# Patient Record
Sex: FEMALE | Race: BLACK OR AFRICAN AMERICAN | URBAN - NONMETROPOLITAN AREA
[De-identification: names, ages, dates, MRNs, and addresses within clinical notes are randomized per-mention and may not be internally consistent; named-entity substitution may affect disease eponyms.]

---

## 2024-02-14 ENCOUNTER — APPOINTMENT (OUTPATIENT)
Dept: URBAN - NONMETROPOLITAN AREA SURGERY 8 | Age: 54
Setting detail: DERMATOLOGY
End: 2024-02-19

## 2024-02-14 DIAGNOSIS — Z71.89 OTHER SPECIFIED COUNSELING: ICD-10-CM

## 2024-02-14 DIAGNOSIS — D22 MELANOCYTIC NEVI: ICD-10-CM

## 2024-02-14 DIAGNOSIS — R21 RASH AND OTHER NONSPECIFIC SKIN ERUPTION: ICD-10-CM

## 2024-02-14 DIAGNOSIS — L85.3 XEROSIS CUTIS: ICD-10-CM

## 2024-02-14 PROBLEM — D22.5 MELANOCYTIC NEVI OF TRUNK: Status: ACTIVE | Noted: 2024-02-14

## 2024-02-14 PROCEDURE — 99203 OFFICE O/P NEW LOW 30 MIN: CPT | Mod: 25

## 2024-02-14 PROCEDURE — OTHER BIOPSY BY PUNCH METHOD: OTHER

## 2024-02-14 PROCEDURE — OTHER PRESCRIPTION: OTHER

## 2024-02-14 PROCEDURE — 11104 PUNCH BX SKIN SINGLE LESION: CPT

## 2024-02-14 PROCEDURE — OTHER ADDITIONAL NOTES: OTHER

## 2024-02-14 PROCEDURE — OTHER MIPS QUALITY: OTHER

## 2024-02-14 PROCEDURE — OTHER COUNSELING: OTHER

## 2024-02-14 RX ORDER — TRIAMCINOLONE ACETONIDE 1 MG/G
CREAM TOPICAL BID
Qty: 453.6 | Refills: 0 | Status: ERX | COMMUNITY
Start: 2024-02-14

## 2024-02-14 ASSESSMENT — LOCATION SIMPLE DESCRIPTION DERM
LOCATION SIMPLE: LEFT UPPER ARM
LOCATION SIMPLE: RIGHT UPPER BACK
LOCATION SIMPLE: UPPER BACK
LOCATION SIMPLE: RIGHT PRETIBIAL REGION
LOCATION SIMPLE: CHEST
LOCATION SIMPLE: LEFT PRETIBIAL REGION
LOCATION SIMPLE: LEFT FOREARM
LOCATION SIMPLE: LEFT UPPER BACK
LOCATION SIMPLE: RIGHT LOWER BACK
LOCATION SIMPLE: RIGHT FOREARM

## 2024-02-14 ASSESSMENT — LOCATION DETAILED DESCRIPTION DERM
LOCATION DETAILED: LEFT MEDIAL SUPERIOR CHEST
LOCATION DETAILED: LEFT INFERIOR UPPER BACK
LOCATION DETAILED: LEFT DISTAL PRETIBIAL REGION
LOCATION DETAILED: SUPERIOR THORACIC SPINE
LOCATION DETAILED: LEFT ANTERIOR DISTAL UPPER ARM
LOCATION DETAILED: RIGHT SUPERIOR MEDIAL UPPER BACK
LOCATION DETAILED: RIGHT VENTRAL PROXIMAL FOREARM
LOCATION DETAILED: RIGHT DISTAL PRETIBIAL REGION
LOCATION DETAILED: LEFT VENTRAL LATERAL DISTAL FOREARM
LOCATION DETAILED: RIGHT INFERIOR LATERAL MIDBACK

## 2024-02-14 ASSESSMENT — LOCATION ZONE DERM
LOCATION ZONE: ARM
LOCATION ZONE: TRUNK
LOCATION ZONE: LEG

## 2024-02-14 NOTE — PROCEDURE: MIPS QUALITY
Quality 431: Preventive Care And Screening: Unhealthy Alcohol Use - Screening: Patient not identified as an unhealthy alcohol user when screened for unhealthy alcohol use using a systematic screening method
Quality 402: Tobacco Use And Help With Quitting Among Adolescents: Patient screened for tobacco and never smoked
Quality 110: Preventive Care And Screening: Influenza Immunization: Influenza Immunization previously received during influenza season
Detail Level: Detailed
Quality 130: Documentation Of Current Medications In The Medical Record: Current Medications Documented

## 2024-02-14 NOTE — PROCEDURE: BIOPSY BY PUNCH METHOD

## 2024-02-14 NOTE — PROCEDURE: ADDITIONAL NOTES
Additional Notes: This is most likely flare of atopic dermatitis that she’s had previously. Most likely will need to fix it due to widespread nature of condition. Would like to biopsy to rule out MF. Examination of buttock today does show mild Xerosis but no patches or plaques resembling MF noted today discuss with patient and family. That cutaneous lymphoma and eczema can appear similar on examination and we will need to follow for both encouraged regular examination of as cutaneous lymphoma is often present in this area.
Detail Level: Zone
Render Risk Assessment In Note?: no

## 2024-02-26 ENCOUNTER — APPOINTMENT (OUTPATIENT)
Dept: URBAN - NONMETROPOLITAN AREA SURGERY 8 | Age: 54
Setting detail: DERMATOLOGY
End: 2024-03-04

## 2024-02-26 DIAGNOSIS — Z48.02 ENCOUNTER FOR REMOVAL OF SUTURES: ICD-10-CM

## 2024-02-26 DIAGNOSIS — L20.89 OTHER ATOPIC DERMATITIS: ICD-10-CM

## 2024-02-26 PROCEDURE — OTHER ADDITIONAL NOTES: OTHER

## 2024-02-26 PROCEDURE — OTHER BIOLOGIC INJECTION TRAINING: OTHER

## 2024-02-26 PROCEDURE — OTHER COUNSELING: OTHER

## 2024-02-26 PROCEDURE — 96372 THER/PROPH/DIAG INJ SC/IM: CPT

## 2024-02-26 PROCEDURE — OTHER SUTURE REMOVAL (GLOBAL PERIOD): OTHER

## 2024-02-26 PROCEDURE — OTHER DUPIXENT INITIATION: OTHER

## 2024-02-26 PROCEDURE — OTHER MIPS QUALITY: OTHER

## 2024-02-26 PROCEDURE — OTHER PRESCRIPTION SAMPLES PROVIDED: OTHER

## 2024-02-26 PROCEDURE — OTHER DUPIXENT INJECTION: OTHER

## 2024-02-26 ASSESSMENT — LOCATION SIMPLE DESCRIPTION DERM
LOCATION SIMPLE: LEFT THIGH
LOCATION SIMPLE: CHEST
LOCATION SIMPLE: RIGHT THIGH
LOCATION SIMPLE: RIGHT LOWER BACK

## 2024-02-26 ASSESSMENT — BSA ECZEMA: % BODY COVERED IN ECZEMA: 20

## 2024-02-26 ASSESSMENT — SEVERITY ASSESSMENT 2020: SEVERITY 2020: MODERATE

## 2024-02-26 ASSESSMENT — LOCATION ZONE DERM
LOCATION ZONE: TRUNK
LOCATION ZONE: LEG
LOCATION ZONE: TRUNK

## 2024-02-26 ASSESSMENT — LOCATION DETAILED DESCRIPTION DERM
LOCATION DETAILED: RIGHT ANTERIOR PROXIMAL THIGH
LOCATION DETAILED: RIGHT SUPERIOR MEDIAL LOWER BACK
LOCATION DETAILED: LEFT ANTERIOR PROXIMAL THIGH
LOCATION DETAILED: RIGHT LATERAL SUPERIOR CHEST

## 2024-02-26 ASSESSMENT — ITCH NUMERIC RATING SCALE: HOW SEVERE IS YOUR ITCHING?: 7

## 2024-02-26 NOTE — PROCEDURE: PRESCRIPTION SAMPLES PROVIDED
Detail Level: Zone
Expiration Date (Optional): 10/31/2025
Lot/Batch Number (Optional): [id number]
Samples Given: Dupixent

## 2024-02-26 NOTE — PROCEDURE: DUPIXENT INJECTION
J-Code: 
Hide Non-Enhanced Ndc Variable: No
36666 Billing Preferences: 1
Ndc (300 Mg Prefilled Pen): 6366-0480-24
Detail Level: None
Syringe Size Used (Required For Enhanced Ndc): 300 mg/2ml prefilled pen
Use Enhanced Ndc?: Yes
Expiration Date (Optional): 10/31/2025
Dupixent Dosing: 600 mg
Consent: The risks of pain and injection site reactions were reviewed with the patient prior to the injection.
Ndc (200 Mg Prefilled Syringe): 1616-6011-13
Additional Comments: Two 300 mg pens were administered
Administered By (Optional): Robert Vincent
Ndc (300 Mg Prefilled Syringe): 9589-2433-93
Ndc (200 Mg Prefilled Pen): 9829-3123-95
Lot # (Optional): [id number]

## 2024-02-26 NOTE — PROCEDURE: DUPIXENT INITIATION
Pregnancy And Lactation Warning Text: There have not been adverse fetal risks in women taking Dupixent while pregnant. It is unknown if this medication is excreted in breast milk.
Dupixent Monitoring Guidelines: There is no laboratory monitoring requirement with Dupixent.
Is Thalidomide Contraindicated?: No
Diagnosis (Required): Atopic Dermatitis/Eczematous Dermatitis
Detail Level: Generalized
Dupixent Dosing: 600 mg SC day 0 then 300 mg SC every other week

## 2024-02-26 NOTE — PROCEDURE: BIOLOGIC INJECTION TRAINING
Skyrizi Training Text: We discussed Skyrizi injection. I demonstrated how to clean the skin and administer the medication.
Siliq Training Text: We discussed Siliq injection. I demonstrated how to clean the skin and administer the medication.
Bimzelx Training Text: We discussed Bimzelx injection. I demonstrated how to clean the skin and administer the medication.
Simponi Training Text: We discussed Simponi injection. I demonstrated how to clean the skin and administer the medication.
Humira Training Text: We discussed Humira injection. I demonstrated how to clean the skin and administer the medication.
Dupixent Training Text: We discussed Dupixent injection. I demonstrated how to clean the skin and administer the medication.
Stelara Training Text: We discussed Stelara injection. I demonstrated how to clean the skin and administer the medication.
Xolair Training Text: We discussed Xolair injection. I demonstrated how to clean the skin and administer the medication.
Cosentyx Training Text: We discussed Cosentyx injection. I demonstrated how to clean the skin and administer the medication.
Cimzia Training Text: We discussed Cimzia injection. I demonstrated how to clean the skin and administer the medication.
Detail Level: Simple
Tremfya Training Text: We discussed Tremfya injection. I demonstrated how to clean the skin and administer the medication.
Taltz Training Text: We discussed Taltz injection. I demonstrated how to clean the skin and administer the medication.
Adbry Training Text: We discussed Adbry injection. I demonstrated how to clean the skin and administer the medication.
Ilumya Training Text: We discussed Ilumya injection. I demonstrated how to clean the skin and administer the medication.
Enbrel Training Text: We discussed Enbrel injection. I demonstrated how to clean the skin and administer the medication.
Which Biologic Is The Patient Receiving Training For?: Dupixent
Who Did You Train: The Patient's Caretaker

## 2024-02-26 NOTE — PROCEDURE: SUTURE REMOVAL (GLOBAL PERIOD)
Body Location Override (Optional - Billing Will Still Be Based On Selected Body Map Location If Applicable): right inferior lateral mid back
Detail Level: Detailed
Add 59407 Cpt? (Important Note: In 2017 The Use Of 76308 Is Being Tracked By Cms To Determine Future Global Period Reimbursement For Global Periods): no

## 2024-02-26 NOTE — PROCEDURE: ADDITIONAL NOTES
Render Risk Assessment In Note?: no
Detail Level: Zone
Additional Notes: Dupixent initiation paperwork given to Marika curran.
Additional Notes: Sample injection given in both thighs

## 2024-03-18 ENCOUNTER — APPOINTMENT (OUTPATIENT)
Dept: URBAN - NONMETROPOLITAN AREA SURGERY 8 | Age: 54
Setting detail: DERMATOLOGY
End: 2024-03-26

## 2024-03-18 DIAGNOSIS — L20.89 OTHER ATOPIC DERMATITIS: ICD-10-CM

## 2024-03-18 PROCEDURE — OTHER COUNSELING: OTHER

## 2024-03-18 PROCEDURE — OTHER ADDITIONAL NOTES: OTHER

## 2024-03-18 PROCEDURE — OTHER DUPIXENT MONITORING: OTHER

## 2024-03-18 PROCEDURE — 96372 THER/PROPH/DIAG INJ SC/IM: CPT

## 2024-03-18 PROCEDURE — OTHER MIPS QUALITY: OTHER

## 2024-03-18 PROCEDURE — OTHER DUPIXENT INJECTION: OTHER

## 2024-03-18 ASSESSMENT — LOCATION SIMPLE DESCRIPTION DERM
LOCATION SIMPLE: CHEST
LOCATION SIMPLE: RIGHT THIGH

## 2024-03-18 ASSESSMENT — LOCATION DETAILED DESCRIPTION DERM
LOCATION DETAILED: RIGHT LATERAL SUPERIOR CHEST
LOCATION DETAILED: RIGHT ANTERIOR PROXIMAL THIGH

## 2024-03-18 ASSESSMENT — ITCH NUMERIC RATING SCALE: HOW SEVERE IS YOUR ITCHING?: 5

## 2024-03-18 ASSESSMENT — LOCATION ZONE DERM
LOCATION ZONE: LEG
LOCATION ZONE: TRUNK

## 2024-03-18 NOTE — HPI: RASH (ATOPIC DERMATITIS)
Is This A New Presentation, Or A Follow-Up?: Follow Up Atopic Dermatitis
Additional History: Pt presents for two week follow up, patient has had some improvement since last visit, itching has decreased from 7/10 to 5/10. Patient's caregiver states that they are receiving the dupixent tomorrow.

## 2024-03-18 NOTE — PROCEDURE: DUPIXENT INJECTION
Syringe Size Used (Required For Enhanced Ndc): 300 mg/2ml prefilled syringe
Expiration Date (Optional): 4/31/26
Ndc (200 Mg Prefilled Pen): 9617-9182-91
Ndc (300 Mg Prefilled Pen): 3111-8042-59
Treatment Number (Optional): 2
Detail Level: None
Consent: The risks of pain and injection site reactions were reviewed with the patient prior to the injection.
Ndc (200 Mg Prefilled Syringe): 2020-9232-14
Dupixent Dosing: 300 mg
Administered By (Optional): Daphne
J-Code: 
55778 Billing Preferences: 1
Use Enhanced Ndc?: Yes
Include J-Code In Bill: No
Ndc (300 Mg Prefilled Syringe): 0124-6807-52
Date Of Next Injection: 2 Weeks
Lot # (Optional): [id number]

## 2024-03-18 NOTE — PROCEDURE: DUPIXENT MONITORING
Detail Level: Zone
Add High Risk Medication Management Associated Diagnosis?: Yes
Comments: Pt received first injection two weeks ago

## 2024-04-29 ENCOUNTER — APPOINTMENT (OUTPATIENT)
Dept: URBAN - NONMETROPOLITAN AREA SURGERY 8 | Age: 54
Setting detail: DERMATOLOGY
End: 2024-05-05

## 2024-04-29 DIAGNOSIS — L85.3 XEROSIS CUTIS: ICD-10-CM

## 2024-04-29 DIAGNOSIS — L20.89 OTHER ATOPIC DERMATITIS: ICD-10-CM

## 2024-04-29 PROCEDURE — OTHER DUPIXENT MONITORING: OTHER

## 2024-04-29 PROCEDURE — OTHER COUNSELING: OTHER

## 2024-04-29 PROCEDURE — OTHER DUPIXENT INJECTION: OTHER

## 2024-04-29 PROCEDURE — OTHER MIPS QUALITY: OTHER

## 2024-04-29 PROCEDURE — 96372 THER/PROPH/DIAG INJ SC/IM: CPT

## 2024-04-29 PROCEDURE — 99212 OFFICE O/P EST SF 10 MIN: CPT | Mod: 25

## 2024-04-29 ASSESSMENT — LOCATION ZONE DERM
LOCATION ZONE: TRUNK
LOCATION ZONE: TRUNK
LOCATION ZONE: LEG
LOCATION ZONE: ARM

## 2024-04-29 ASSESSMENT — ITCH NUMERIC RATING SCALE: HOW SEVERE IS YOUR ITCHING?: 5

## 2024-04-29 ASSESSMENT — LOCATION SIMPLE DESCRIPTION DERM
LOCATION SIMPLE: RIGHT THIGH
LOCATION SIMPLE: LEFT SHOULDER
LOCATION SIMPLE: LEFT UPPER BACK
LOCATION SIMPLE: CHEST

## 2024-04-29 ASSESSMENT — LOCATION DETAILED DESCRIPTION DERM
LOCATION DETAILED: LEFT ANTERIOR SHOULDER
LOCATION DETAILED: RIGHT ANTERIOR PROXIMAL THIGH
LOCATION DETAILED: LEFT MEDIAL UPPER BACK
LOCATION DETAILED: RIGHT LATERAL SUPERIOR CHEST

## 2024-04-29 NOTE — PROCEDURE: DUPIXENT INJECTION
Syringe Size Used (Required For Enhanced Ndc): 300 mg/2ml prefilled syringe
Expiration Date (Optional): 4/31/26
Ndc (200 Mg Prefilled Pen): 0737-8864-19
Ndc (300 Mg Prefilled Pen): 4562-3458-50
Treatment Number (Optional): 4
Detail Level: None
Consent: The risks of pain and injection site reactions were reviewed with the patient prior to the injection.
Ndc (200 Mg Prefilled Syringe): 8409-1442-90
Dupixent Dosing: 300 mg
Administered By (Optional): Daphne
J-Code: 
01089 Billing Preferences: 1
Use Enhanced Ndc?: Yes
Include J-Code In Bill: No
Ndc (300 Mg Prefilled Syringe): 4082-0396-27
Date Of Next Injection: 2 Weeks
Lot # (Optional): [id number]

## 2024-04-29 NOTE — PROCEDURE: MIPS QUALITY
Quality 431: Preventive Care And Screening: Unhealthy Alcohol Use - Screening: Patient not identified as an unhealthy alcohol user when screened for unhealthy alcohol use using a systematic screening method
Quality 226: Preventive Care And Screening: Tobacco Use: Screening And Cessation Intervention: Patient screened for tobacco use and is an ex/non-smoker
Detail Level: Detailed
Quality 130: Documentation Of Current Medications In The Medical Record: Current Medications Documented
Quality 486: Dermatitis - Improvement In Patient-Reported Itch Severity: Itch severity assessment score was not reduced by at least 3 points from the initial (index) score to the follow-up visit score or assessment was not completed during the follow-up encounter

## 2024-07-01 ENCOUNTER — APPOINTMENT (OUTPATIENT)
Dept: URBAN - NONMETROPOLITAN AREA SURGERY 8 | Age: 54
Setting detail: DERMATOLOGY
End: 2024-07-01

## 2024-07-01 DIAGNOSIS — L20.89 OTHER ATOPIC DERMATITIS: ICD-10-CM

## 2024-07-01 DIAGNOSIS — L85.3 XEROSIS CUTIS: ICD-10-CM

## 2024-07-01 PROCEDURE — OTHER DUPIXENT MONITORING: OTHER

## 2024-07-01 PROCEDURE — OTHER ADDITIONAL NOTES: OTHER

## 2024-07-01 PROCEDURE — OTHER PRESCRIPTION: OTHER

## 2024-07-01 PROCEDURE — OTHER ORDER TESTS: OTHER

## 2024-07-01 PROCEDURE — OTHER RINVOQ INITIATION: OTHER

## 2024-07-01 PROCEDURE — OTHER COUNSELING: OTHER

## 2024-07-01 PROCEDURE — OTHER MIPS QUALITY: OTHER

## 2024-07-01 PROCEDURE — 99214 OFFICE O/P EST MOD 30 MIN: CPT

## 2024-07-01 RX ORDER — TRIAMCINOLONE ACETONIDE 1 MG/G
CREAM TOPICAL BID
Qty: 80 | Refills: 1 | Status: ERX

## 2024-07-01 ASSESSMENT — LOCATION DETAILED DESCRIPTION DERM
LOCATION DETAILED: LEFT MEDIAL UPPER BACK
LOCATION DETAILED: RIGHT LATERAL SUPERIOR CHEST

## 2024-07-01 ASSESSMENT — SEVERITY ASSESSMENT 2020: SEVERITY 2020: MODERATE

## 2024-07-01 ASSESSMENT — LOCATION SIMPLE DESCRIPTION DERM
LOCATION SIMPLE: LEFT UPPER BACK
LOCATION SIMPLE: CHEST

## 2024-07-01 ASSESSMENT — ITCH NUMERIC RATING SCALE: HOW SEVERE IS YOUR ITCHING?: 6

## 2024-07-01 ASSESSMENT — LOCATION ZONE DERM
LOCATION ZONE: TRUNK
LOCATION ZONE: TRUNK

## 2024-07-01 ASSESSMENT — BSA ECZEMA: % BODY COVERED IN ECZEMA: 20

## 2024-07-01 NOTE — PROCEDURE: ADDITIONAL NOTES
Render Risk Assessment In Note?: no
Additional Notes: Will submit enrollment once labs have been reviewed
Detail Level: Zone

## 2024-07-01 NOTE — PROCEDURE: ORDER TESTS
Bill For Surgical Tray: no
Performing Laboratory: 0
Expected Date Of Service: 07/01/2024
Billing Type: Third-Party Bill

## 2024-08-05 ENCOUNTER — APPOINTMENT (OUTPATIENT)
Dept: URBAN - NONMETROPOLITAN AREA SURGERY 8 | Age: 54
Setting detail: DERMATOLOGY
End: 2024-08-06

## 2024-08-05 DIAGNOSIS — L20.89 OTHER ATOPIC DERMATITIS: ICD-10-CM

## 2024-08-05 PROCEDURE — OTHER COUNSELING: OTHER

## 2024-08-05 PROCEDURE — OTHER DUPIXENT MONITORING: OTHER

## 2024-08-05 PROCEDURE — 96372 THER/PROPH/DIAG INJ SC/IM: CPT

## 2024-08-05 PROCEDURE — OTHER ORDER TESTS: OTHER

## 2024-08-05 PROCEDURE — OTHER MIPS QUALITY: OTHER

## 2024-08-05 PROCEDURE — OTHER RINVOQ INITIATION: OTHER

## 2024-08-05 PROCEDURE — OTHER ADDITIONAL NOTES: OTHER

## 2024-08-05 PROCEDURE — OTHER DUPIXENT INJECTION: OTHER

## 2024-08-05 ASSESSMENT — LOCATION DETAILED DESCRIPTION DERM
LOCATION DETAILED: LEFT POSTERIOR SHOULDER
LOCATION DETAILED: RIGHT LATERAL SUPERIOR CHEST

## 2024-08-05 ASSESSMENT — ITCH NUMERIC RATING SCALE: HOW SEVERE IS YOUR ITCHING?: 6

## 2024-08-05 ASSESSMENT — LOCATION SIMPLE DESCRIPTION DERM
LOCATION SIMPLE: LEFT SHOULDER
LOCATION SIMPLE: CHEST

## 2024-08-05 ASSESSMENT — LOCATION ZONE DERM
LOCATION ZONE: TRUNK
LOCATION ZONE: ARM

## 2024-08-05 NOTE — PROCEDURE: ORDER TESTS
Performing Laboratory: 0
Billing Type: Third-Party Bill
Bill For Surgical Tray: no
Expected Date Of Service: 08/05/2024

## 2024-08-05 NOTE — PROCEDURE: ADDITIONAL NOTES
Render Risk Assessment In Note?: no
Additional Notes: Quantiferon gold was not performed with her most recent blood work. A lab slip was given today for quant gold and once that is received, we will submit rinvoq paperwork. She is scheduled to see the allergist in November.
Detail Level: Zone

## 2024-08-05 NOTE — PROCEDURE: RINVOQ INITIATION
Rinvoq Dosing: 15mg Daily
Diagnosis (Required): Atopic Dermatitis/Eczematous Dermatitis
Is Soriatane Contraindicated?: No
Detail Level: Zone
Pregnancy And Lactation Warning Text: Based on animal studies, Rinvoq may cause embryo-fetal harm when administered to pregnant women.  The medication should not be used in pregnancy.  Breastfeeding is not recommended during treatment and for 6 days after the last dose.
Rinvoq Monitoring Guidelines: CBC, LFTs, lipids, hepatitis screening, TB screening and pregnancy tests should be checked prior to initiating Rinvoq therapy.  Labs may also be checked periodically after the initiation period.

## 2024-08-05 NOTE — PROCEDURE: DUPIXENT INJECTION
Syringe Size Used (Required For Enhanced Ndc): 300 mg/2ml prefilled pen
Ndc (200 Mg Prefilled Syringe): 9848-2923-86
Dupixent Dosing: 300 mg
Consent: The risks of pain and injection site reactions were reviewed with the patient prior to the injection.
Expiration Date (Optional): 02/28/2026
Detail Level: None
Use Enhanced Ndc?: Yes
J-Code: 
Ndc (300 Mg Prefilled Pen): 2441-9039-12
Include J-Code In Bill: No
Lot # (Optional): [id number]
06064 Billing Preferences: 1
Administered By (Optional): Rona
Ndc (200 Mg Prefilled Pen): 3770-2607-70
Date Of Next Injection: 2 Weeks
Ndc (300 Mg Prefilled Syringe): 0635-2487-59

## 2024-09-09 ENCOUNTER — APPOINTMENT (OUTPATIENT)
Dept: URBAN - NONMETROPOLITAN AREA SURGERY 8 | Age: 54
Setting detail: DERMATOLOGY
End: 2024-09-09

## 2024-09-09 DIAGNOSIS — L20.89 OTHER ATOPIC DERMATITIS: ICD-10-CM

## 2024-09-09 PROCEDURE — OTHER RINVOQ INITIATION: OTHER

## 2024-09-09 PROCEDURE — OTHER COUNSELING: OTHER

## 2024-09-09 PROCEDURE — 99214 OFFICE O/P EST MOD 30 MIN: CPT

## 2024-09-09 PROCEDURE — OTHER MIPS QUALITY: OTHER

## 2024-09-09 PROCEDURE — OTHER ADDITIONAL NOTES: OTHER

## 2024-09-09 PROCEDURE — OTHER DUPIXENT MONITORING: OTHER

## 2024-09-09 ASSESSMENT — SEVERITY ASSESSMENT 2020: SEVERITY 2020: MODERATE

## 2024-09-09 ASSESSMENT — ITCH NUMERIC RATING SCALE: HOW SEVERE IS YOUR ITCHING?: 5

## 2024-09-09 ASSESSMENT — LOCATION SIMPLE DESCRIPTION DERM: LOCATION SIMPLE: CHEST

## 2024-09-09 ASSESSMENT — LOCATION ZONE DERM: LOCATION ZONE: TRUNK

## 2024-09-09 ASSESSMENT — LOCATION DETAILED DESCRIPTION DERM: LOCATION DETAILED: RIGHT LATERAL SUPERIOR CHEST

## 2024-09-09 ASSESSMENT — BSA ECZEMA: % BODY COVERED IN ECZEMA: 15

## 2024-09-09 NOTE — PROCEDURE: DUPIXENT MONITORING
Detail Level: Zone
Add High Risk Medication Management Associated Diagnosis?: Yes
Comments: Patient will discontinue on September 16th, and start Rinvoq

## 2024-09-09 NOTE — HPI: FOLLOW-UP
What Is The Condition That You Are Returning For Follow-Up?: other
Additional History: Atopic dermatitis follow up. Patient reports a 5 on the itch scale. She had her last dupixent injection on September 2nd. She will discontinue injections and start Rinvoq 15mg on September 16th.

## 2024-09-09 NOTE — PROCEDURE: RINVOQ INITIATION
Rinvoq Dosing: 15mg Daily
Comments: patient will begin medication on September 16th.
Diagnosis (Required): Atopic Dermatitis/Eczematous Dermatitis
Is Soriatane Contraindicated?: No
Detail Level: Zone
Pregnancy And Lactation Warning Text: Based on animal studies, Rinvoq may cause embryo-fetal harm when administered to pregnant women.  The medication should not be used in pregnancy.  Breastfeeding is not recommended during treatment and for 6 days after the last dose.
Rinvoq Monitoring Guidelines: CBC, LFTs, lipids, hepatitis screening, TB screening and pregnancy tests should be checked prior to initiating Rinvoq therapy.  Labs may also be checked periodically after the initiation period.

## 2024-09-09 NOTE — PROCEDURE: ADDITIONAL NOTES
Render Risk Assessment In Note?: no
Additional Notes: Her baseline labs were negative. We will give her a lab slip at the next visit in one month to recheck monthly labs.
Detail Level: Zone

## 2024-09-09 NOTE — PROCEDURE: MIPS QUALITY
Quality 431: Preventive Care And Screening: Unhealthy Alcohol Use - Screening: Patient not identified as an unhealthy alcohol user when screened for unhealthy alcohol use using a systematic screening method
Quality 226: Preventive Care And Screening: Tobacco Use: Screening And Cessation Intervention: Patient screened for tobacco use and is an ex/non-smoker
Quality 410: Psoriasis Clinical Response To Oral Systemic Or Biologic Medications: Patient has been on biologic or system therapy for less than 6 months
Detail Level: Detailed
Quality 485: Psoriasis - Improvement In Patient-Reported Itch Severity: Itch severity assessment score is reduced by 3 or more points from the initial (index) assessment score to the follow-up visit score
Quality 130: Documentation Of Current Medications In The Medical Record: Current Medications Documented
Quality 486: Dermatitis - Improvement In Patient-Reported Itch Severity: Itch severity assessment score was not reduced by at least 3 points from the initial (index) score to the follow-up visit score or assessment was not completed during the follow-up encounter

## 2024-10-14 ENCOUNTER — APPOINTMENT (OUTPATIENT)
Dept: URBAN - NONMETROPOLITAN AREA SURGERY 8 | Age: 54
Setting detail: DERMATOLOGY
End: 2024-10-15

## 2024-10-14 DIAGNOSIS — L20.89 OTHER ATOPIC DERMATITIS: ICD-10-CM

## 2024-10-14 PROCEDURE — 99214 OFFICE O/P EST MOD 30 MIN: CPT

## 2024-10-14 PROCEDURE — OTHER MIPS QUALITY: OTHER

## 2024-10-14 PROCEDURE — OTHER RINVOQ MONITORING: OTHER

## 2024-10-14 PROCEDURE — OTHER COUNSELING: OTHER

## 2024-10-14 PROCEDURE — OTHER ORDER TESTS: OTHER

## 2024-10-14 ASSESSMENT — ITCH NUMERIC RATING SCALE: HOW SEVERE IS YOUR ITCHING?: 0

## 2024-10-14 ASSESSMENT — BSA ECZEMA: % BODY COVERED IN ECZEMA: 0

## 2024-10-14 ASSESSMENT — LOCATION ZONE DERM: LOCATION ZONE: TRUNK

## 2024-10-14 ASSESSMENT — SEVERITY ASSESSMENT 2020: SEVERITY 2020: CLEAR

## 2024-10-14 ASSESSMENT — LOCATION DETAILED DESCRIPTION DERM: LOCATION DETAILED: RIGHT LATERAL SUPERIOR CHEST

## 2024-10-14 ASSESSMENT — LOCATION SIMPLE DESCRIPTION DERM: LOCATION SIMPLE: CHEST

## 2024-10-14 NOTE — PROCEDURE: ORDER TESTS
Expected Date Of Service: 10/14/2024
Billing Type: Third-Party Bill
Performing Laboratory: [id number]
Lab Facility: 0
Bill For Surgical Tray: no

## 2024-10-14 NOTE — HPI: FOLLOW-UP
What Is The Condition That You Are Returning For Follow-Up?: other
Additional History: Patient is following up for atopic dermatitis. Patient is currently taking RINVOQ and skin is clear. Her itch scale is a zero.

## 2025-01-20 ENCOUNTER — APPOINTMENT (OUTPATIENT)
Dept: URBAN - NONMETROPOLITAN AREA SURGERY 8 | Age: 55
Setting detail: DERMATOLOGY
End: 2025-01-20

## 2025-01-20 DIAGNOSIS — L20.89 OTHER ATOPIC DERMATITIS: ICD-10-CM

## 2025-01-20 PROCEDURE — OTHER RINVOQ MONITORING: OTHER

## 2025-01-20 PROCEDURE — OTHER COUNSELING: OTHER

## 2025-01-20 PROCEDURE — 99214 OFFICE O/P EST MOD 30 MIN: CPT

## 2025-01-20 PROCEDURE — OTHER MIPS QUALITY: OTHER

## 2025-01-20 PROCEDURE — OTHER ORDER TESTS: OTHER

## 2025-01-20 ASSESSMENT — LOCATION SIMPLE DESCRIPTION DERM: LOCATION SIMPLE: CHEST

## 2025-01-20 ASSESSMENT — SEVERITY ASSESSMENT 2020: SEVERITY 2020: CLEAR

## 2025-01-20 ASSESSMENT — LOCATION ZONE DERM: LOCATION ZONE: TRUNK

## 2025-01-20 ASSESSMENT — BSA ECZEMA: % BODY COVERED IN ECZEMA: 0

## 2025-01-20 ASSESSMENT — LOCATION DETAILED DESCRIPTION DERM: LOCATION DETAILED: RIGHT LATERAL SUPERIOR CHEST

## 2025-01-20 NOTE — PROCEDURE: MIPS QUALITY
Quality 431: Preventive Care And Screening: Unhealthy Alcohol Use - Screening: Patient not identified as an unhealthy alcohol user when screened for unhealthy alcohol use using a systematic screening method
Quality 226: Preventive Care And Screening: Tobacco Use: Screening And Cessation Intervention: Patient screened for tobacco use and is an ex/non-smoker
Quality 410: Psoriasis Clinical Response To Oral Systemic Or Biologic Medications: Patient has been on biologic or system therapy for less than 6 months
Detail Level: Detailed
Quality 130: Documentation Of Current Medications In The Medical Record: Current Medications Documented
Quality 486: Dermatitis - Improvement In Patient-Reported Itch Severity: Itch severity assessment score is reduced by 3 or more points from the initial (index) assessment score to the follow-up visit score

## 2025-01-20 NOTE — PROCEDURE: RINVOQ MONITORING
Add High Risk Medication Management Associated Diagnosis?: Yes
Most Recent Lipid Panel (Optional): wnl
Detail Level: Zone
Current Dosage: 15mg Daily

## 2025-01-20 NOTE — PROCEDURE: ORDER TESTS
Expected Date Of Service: 10/14/2024
Billing Type: Third-Party Bill
Performing Laboratory: [id number]
Bill For Surgical Tray: no

## 2025-03-07 RX ORDER — TRIAMCINOLONE ACETONIDE 1 MG/G
CREAM TOPICAL BID
Qty: 80 | Refills: 2 | Status: ERX

## 2025-04-21 ENCOUNTER — APPOINTMENT (OUTPATIENT)
Dept: URBAN - NONMETROPOLITAN AREA SURGERY 8 | Age: 55
Setting detail: DERMATOLOGY
End: 2025-04-22

## 2025-04-21 DIAGNOSIS — L20.89 OTHER ATOPIC DERMATITIS: ICD-10-CM

## 2025-04-21 DIAGNOSIS — Z71.89 OTHER SPECIFIED COUNSELING: ICD-10-CM

## 2025-04-21 PROCEDURE — OTHER MIPS QUALITY: OTHER

## 2025-04-21 PROCEDURE — OTHER ADDITIONAL NOTES: OTHER

## 2025-04-21 PROCEDURE — OTHER ORDER TESTS: OTHER

## 2025-04-21 PROCEDURE — 99213 OFFICE O/P EST LOW 20 MIN: CPT

## 2025-04-21 PROCEDURE — OTHER COUNSELING: OTHER

## 2025-04-21 PROCEDURE — OTHER PRESCRIPTION MEDICATION MANAGEMENT: OTHER

## 2025-04-21 PROCEDURE — OTHER RINVOQ MONITORING: OTHER

## 2025-04-21 PROCEDURE — OTHER PRESCRIPTION: OTHER

## 2025-04-21 RX ORDER — HYDROCORTISONE 25 MG/G
CREAM TOPICAL
Qty: 30 | Refills: 6 | Status: ERX | COMMUNITY
Start: 2025-04-21

## 2025-04-21 ASSESSMENT — LOCATION SIMPLE DESCRIPTION DERM: LOCATION SIMPLE: CHEST

## 2025-04-21 ASSESSMENT — LOCATION ZONE DERM: LOCATION ZONE: TRUNK

## 2025-04-21 ASSESSMENT — ITCH NUMERIC RATING SCALE: HOW SEVERE IS YOUR ITCHING?: 5

## 2025-04-21 ASSESSMENT — BSA ECZEMA: % BODY COVERED IN ECZEMA: 1

## 2025-04-21 ASSESSMENT — LOCATION DETAILED DESCRIPTION DERM: LOCATION DETAILED: RIGHT LATERAL SUPERIOR CHEST

## 2025-04-21 ASSESSMENT — SEVERITY ASSESSMENT 2020: SEVERITY 2020: ALMOST CLEAR

## 2025-04-21 NOTE — PROCEDURE: RINVOQ MONITORING
Add High Risk Medication Management Associated Diagnosis?: Yes
Most Recent Lipid Panel (Optional): wnl
Most Recent Cbc (Optional): abnormal
Detail Level: Zone
Current Dosage: 15mg Daily

## 2025-04-21 NOTE — PROCEDURE: ADDITIONAL NOTES
Additional Notes: Pt will have CBC performed in one month, and routine labs in 3 months.
Detail Level: Zone
Render Risk Assessment In Note?: no
Additional Notes: Absolute neutrophil count has dropped to 1,420. If it drops to less than 1000 we would need to discontinue medication. She denies any fevers or infection infections. We will repeat her CBC in one month and at three months.

## 2025-04-21 NOTE — PROCEDURE: PRESCRIPTION MEDICATION MANAGEMENT
Render In Strict Bullet Format?: No
Detail Level: Generalized
Continue Regimen: Rinvoq, Triamcinolone, and hydrocortisone cream

## 2025-04-21 NOTE — PROCEDURE: ORDER TESTS
Expected Date Of Service: 10/14/2024
Billing Type: Third-Party Bill
Performing Laboratory: [id number]
Bill For Surgical Tray: no

## 2025-04-21 NOTE — PROCEDURE: ORDER TESTS
Expected Date Of Service: 10/14/2024
Billing Type: Third-Party Bill
Performing Laboratory: [id number]
Bill For Surgical Tray: no

## 2025-07-14 ENCOUNTER — APPOINTMENT (OUTPATIENT)
Dept: URBAN - NONMETROPOLITAN AREA SURGERY 8 | Age: 55
Setting detail: DERMATOLOGY
End: 2025-07-15

## 2025-07-14 DIAGNOSIS — Z71.89 OTHER SPECIFIED COUNSELING: ICD-10-CM

## 2025-07-14 DIAGNOSIS — L20.89 OTHER ATOPIC DERMATITIS: ICD-10-CM

## 2025-07-14 PROCEDURE — OTHER ORDER TESTS: OTHER

## 2025-07-14 PROCEDURE — OTHER PRESCRIPTION: OTHER

## 2025-07-14 PROCEDURE — OTHER RINVOQ MONITORING: OTHER

## 2025-07-14 PROCEDURE — OTHER ADDITIONAL NOTES: OTHER

## 2025-07-14 PROCEDURE — OTHER COUNSELING: OTHER

## 2025-07-14 PROCEDURE — OTHER MIPS QUALITY: OTHER

## 2025-07-14 PROCEDURE — 99213 OFFICE O/P EST LOW 20 MIN: CPT

## 2025-07-14 PROCEDURE — OTHER PRESCRIPTION MEDICATION MANAGEMENT: OTHER

## 2025-07-14 RX ORDER — HYDROCORTISONE 25 MG/G
CREAM TOPICAL
Qty: 30 | Refills: 6 | Status: ERX

## 2025-07-14 RX ORDER — TRIAMCINOLONE ACETONIDE 1 MG/G
CREAM TOPICAL BID
Qty: 453.6 | Refills: 0 | Status: ERX

## 2025-07-14 ASSESSMENT — ITCH NUMERIC RATING SCALE: HOW SEVERE IS YOUR ITCHING?: 1

## 2025-07-14 ASSESSMENT — LOCATION ZONE DERM
LOCATION ZONE: AXILLAE
LOCATION ZONE: TRUNK
LOCATION ZONE: TRUNK

## 2025-07-14 ASSESSMENT — LOCATION DETAILED DESCRIPTION DERM
LOCATION DETAILED: RIGHT AXILLARY VAULT
LOCATION DETAILED: RIGHT LATERAL SUPERIOR CHEST
LOCATION DETAILED: LEFT AXILLARY VAULT
LOCATION DETAILED: UMBILICUS

## 2025-07-14 ASSESSMENT — BSA ECZEMA: % BODY COVERED IN ECZEMA: 1

## 2025-07-14 ASSESSMENT — LOCATION SIMPLE DESCRIPTION DERM
LOCATION SIMPLE: LEFT AXILLARY VAULT
LOCATION SIMPLE: CHEST
LOCATION SIMPLE: ABDOMEN
LOCATION SIMPLE: RIGHT AXILLARY VAULT

## 2025-07-14 ASSESSMENT — SEVERITY ASSESSMENT 2020: SEVERITY 2020: ALMOST CLEAR

## 2025-08-29 ENCOUNTER — RX ONLY (RX ONLY)
Age: 55
End: 2025-08-29

## 2025-08-29 RX ORDER — UPADACITINIB 15 MG/1
TABLET, EXTENDED RELEASE ORAL
Qty: 30 | Refills: 1 | Status: ERX | COMMUNITY
Start: 2025-08-29

## 2025-09-03 ENCOUNTER — RX ONLY (RX ONLY)
Age: 55
End: 2025-09-03

## 2025-09-03 RX ORDER — UPADACITINIB 15 MG/1
TABLET, EXTENDED RELEASE ORAL
Qty: 30 | Refills: 1 | Status: ERX